# Patient Record
Sex: MALE | Race: WHITE | NOT HISPANIC OR LATINO | Employment: UNEMPLOYED | URBAN - NONMETROPOLITAN AREA
[De-identification: names, ages, dates, MRNs, and addresses within clinical notes are randomized per-mention and may not be internally consistent; named-entity substitution may affect disease eponyms.]

---
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
## 2021-02-09 ENCOUNTER — OFFICE VISIT (OUTPATIENT)
Dept: FAMILY MEDICINE CLINIC | Facility: CLINIC | Age: 21
End: 2021-02-09

## 2021-02-09 VITALS
HEART RATE: 103 BPM | OXYGEN SATURATION: 97 % | WEIGHT: 233.8 LBS | TEMPERATURE: 97.1 F | HEIGHT: 73 IN | BODY MASS INDEX: 30.99 KG/M2

## 2021-02-09 DIAGNOSIS — K08.89 TOOTH PAIN: Primary | ICD-10-CM

## 2021-02-09 PROCEDURE — 99212 OFFICE O/P EST SF 10 MIN: CPT | Performed by: FAMILY MEDICINE

## 2021-02-09 RX ORDER — AMOXICILLIN 400 MG/5ML
800 POWDER, FOR SUSPENSION ORAL 2 TIMES DAILY
Qty: 200 ML | Refills: 0 | Status: SHIPPED | OUTPATIENT
Start: 2021-02-09 | End: 2022-04-06 | Stop reason: SDUPTHER

## 2021-02-09 NOTE — PROGRESS NOTES
" Subjective   Silas Brady is a 20 y.o. male.     Chief Complaint   Patient presents with   • Dental Pain             History of Present Illness     Autistic  Tooth pain starting 3 days ago.  Suffering.  Difficult to give medicines.  vomited 2 ibuprofen.   Dentist wont see him until dad can get power of  over him.       Review of Systems   Unable to perform ROS: Psychiatric disorder           Pulse 103   Temp 97.1 °F (36.2 °C) (Infrared)   Ht 185.9 cm (73.19\")   Wt 106 kg (233 lb 12.8 oz)   SpO2 97%   BMI 30.69 kg/m²       Objective     Physical Exam  Vitals signs and nursing note reviewed.   Constitutional:       Appearance: Normal appearance. He is well-developed.   HENT:      Head: Normocephalic and atraumatic.      Right Ear: External ear normal.      Left Ear: External ear normal.      Nose: Nose normal.      Mouth/Throat:      Mouth: Mucous membranes are moist.      Pharynx: Oropharynx is clear.      Comments: Gums not red.  Looks like right inferior wisdom tooth coming in.   Eyes:      Extraocular Movements: Extraocular movements intact.      Conjunctiva/sclera: Conjunctivae normal.      Pupils: Pupils are equal, round, and reactive to light.   Neck:      Musculoskeletal: Normal range of motion.   Pulmonary:      Effort: Pulmonary effort is normal.   Musculoskeletal: Normal range of motion.   Neurological:      General: No focal deficit present.      Mental Status: He is alert and oriented to person, place, and time.   Psychiatric:         Behavior: Behavior normal.         Thought Content: Thought content normal.         Judgment: Judgment normal.             PAST MEDICAL HISTORY     Past Medical History:   Diagnosis Date   • Acute serous otitis media    • Allergic rhinitis due to pollen    • Autistic disorder     With behevariol issues    • Common cold    • Cough    • Drug therapy     Long-term drug therapy      • General medical examination     General examination of patient    • " Health maintenance examination     Individual health examination     • Nausea and vomiting       PAST SURGICAL HISTORY   No past surgical history on file.   SOCIAL HISTORY     Social History     Socioeconomic History   • Marital status: Single     Spouse name: Not on file   • Number of children: Not on file   • Years of education: Not on file   • Highest education level: Not on file   Tobacco Use   • Smoking status: Never Smoker   • Smokeless tobacco: Never Used   Substance and Sexual Activity   • Alcohol use: No   • Drug use: No   • Sexual activity: Defer      ALLERGIES   Patient has no known allergies.   MEDICATIONS     Current Outpatient Medications   Medication Sig Dispense Refill   • risperiDONE (risperDAL M-TABS) 2 MG disintegrating tablet Take 2 mg by mouth 2 (Two) Times a Day.     • amoxicillin (AMOXIL) 400 MG/5ML suspension Take 10 mL by mouth 2 (Two) Times a Day. 200 mL 0   • ibuprofen (Childrens Motrin) 100 MG/5ML suspension Take 40 mL by mouth Every 8 (Eight) Hours As Needed for Mild Pain . 480 mL 1   • mometasone (ELOCON) 0.1 % ointment Apply  topically to the appropriate area as directed Daily. 15 g 1     No current facility-administered medications for this visit.         The following portions of the patient's history were reviewed and updated as appropriate: allergies, current medications, past family history, past medical history, past social history, past surgical history and problem list.        Assessment/Plan   Diagnoses and all orders for this visit:    1. Tooth pain (Primary)    Other orders  -     amoxicillin (AMOXIL) 400 MG/5ML suspension; Take 10 mL by mouth 2 (Two) Times a Day.  Dispense: 200 mL; Refill: 0  -     ibuprofen (Childrens Motrin) 100 MG/5ML suspension; Take 40 mL by mouth Every 8 (Eight) Hours As Needed for Mild Pain .  Dispense: 480 mL; Refill: 1        Call if this doesn't help.               No follow-ups on file.                  This document has been electronically  signed by Roni Marroquin MD on February 9, 2021 11:44 CST

## 2022-04-06 ENCOUNTER — OFFICE VISIT (OUTPATIENT)
Dept: FAMILY MEDICINE CLINIC | Facility: CLINIC | Age: 22
End: 2022-04-06

## 2022-04-06 VITALS
TEMPERATURE: 97.7 F | WEIGHT: 238.5 LBS | HEIGHT: 73 IN | OXYGEN SATURATION: 98 % | HEART RATE: 97 BPM | BODY MASS INDEX: 31.61 KG/M2

## 2022-04-06 DIAGNOSIS — H61.22 IMPACTED CERUMEN OF LEFT EAR: Primary | ICD-10-CM

## 2022-04-06 PROCEDURE — 99213 OFFICE O/P EST LOW 20 MIN: CPT | Performed by: FAMILY MEDICINE

## 2022-04-06 RX ORDER — LORATADINE 10 MG/1
10 TABLET ORAL DAILY
Qty: 30 TABLET | Refills: 11 | Status: SHIPPED | OUTPATIENT
Start: 2022-04-06

## 2022-04-06 RX ORDER — AMOXICILLIN 400 MG/5ML
800 POWDER, FOR SUSPENSION ORAL 2 TIMES DAILY
Qty: 200 ML | Refills: 0 | Status: SHIPPED | OUTPATIENT
Start: 2022-04-06

## 2022-04-06 NOTE — PROGRESS NOTES
" Subjective   Silas Brady is a 21 y.o. male.     Chief Complaint   Patient presents with   • Earache             History of Present Illness     Dad says maybe? His left ear is hurting.  Silas has autism.        Review of Systems   Constitutional: Negative for chills, fatigue and fever.   HENT: Positive for ear pain. Negative for congestion, ear discharge, facial swelling, hearing loss, postnasal drip, rhinorrhea, sinus pressure, sore throat, trouble swallowing and voice change.    Eyes: Negative for discharge, redness and visual disturbance.   Respiratory: Negative for cough, chest tightness, shortness of breath and wheezing.    Cardiovascular: Negative for chest pain and palpitations.   Gastrointestinal: Negative for abdominal pain, blood in stool, constipation, diarrhea, nausea and vomiting.   Endocrine: Negative for polydipsia and polyuria.   Genitourinary: Negative for dysuria, flank pain, hematuria and urgency.   Musculoskeletal: Negative for arthralgias, back pain, joint swelling and myalgias.   Skin: Negative for rash.   Neurological: Negative for dizziness, weakness, numbness and headaches.   Hematological: Negative for adenopathy.   Psychiatric/Behavioral: Negative for confusion and sleep disturbance. The patient is not nervous/anxious.            Pulse 97   Temp 97.7 °F (36.5 °C) (Infrared)   Ht 185.9 cm (73.19\")   Wt 108 kg (238 lb 8 oz)   SpO2 98%   BMI 31.30 kg/m²       Objective     Physical Exam  Vitals and nursing note reviewed.   Constitutional:       Appearance: Normal appearance. He is well-developed.   HENT:      Head: Normocephalic and atraumatic.      Right Ear: Tympanic membrane, ear canal and external ear normal.      Left Ear: External ear normal.      Ears:      Comments: Soft cerumen impaction left ear.      Nose: Nose normal.   Eyes:      Extraocular Movements: Extraocular movements intact.      Conjunctiva/sclera: Conjunctivae normal.      Pupils: Pupils are equal, " round, and reactive to light.   Pulmonary:      Effort: Pulmonary effort is normal.   Musculoskeletal:         General: Normal range of motion.      Cervical back: Normal range of motion.   Neurological:      General: No focal deficit present.      Mental Status: He is alert and oriented to person, place, and time.   Psychiatric:         Behavior: Behavior normal.         Thought Content: Thought content normal.         Judgment: Judgment normal.             PAST MEDICAL HISTORY     Past Medical History:   Diagnosis Date   • Acute serous otitis media    • Allergic rhinitis due to pollen    • Autistic disorder     With behevariol issues    • Common cold    • Cough    • Drug therapy     Long-term drug therapy      • General medical examination     General examination of patient    • Health maintenance examination     Individual health examination     • Nausea and vomiting       PAST SURGICAL HISTORY   No past surgical history on file.   SOCIAL HISTORY     Social History     Socioeconomic History   • Marital status: Single   Tobacco Use   • Smoking status: Never Smoker   • Smokeless tobacco: Never Used   Substance and Sexual Activity   • Alcohol use: No   • Drug use: No   • Sexual activity: Defer      ALLERGIES   Patient has no known allergies.   MEDICATIONS     Current Outpatient Medications   Medication Sig Dispense Refill   • amoxicillin (AMOXIL) 400 MG/5ML suspension Take 10 mL by mouth 2 (Two) Times a Day. PHARMACIST, PLEASE PUT RX ON HOLD. DO NOT  MIX. THANKS. 200 mL 0   • risperiDONE (risperDAL M-TABS) 2 MG disintegrating tablet Take 2 mg by mouth 2 (Two) Times a Day.     • ibuprofen (Childrens Motrin) 100 MG/5ML suspension Take 40 mL by mouth Every 8 (Eight) Hours As Needed for Mild Pain . 480 mL 1   • loratadine (Claritin) 10 MG tablet Take 1 tablet by mouth Daily. 30 tablet 11   • mometasone (ELOCON) 0.1 % ointment Apply  topically to the appropriate area as directed Daily. 15 g 1     No current  facility-administered medications for this visit.        The following portions of the patient's history were reviewed and updated as appropriate: allergies, current medications, past family history, past medical history, past social history, past surgical history and problem list.        Assessment/Plan   Diagnoses and all orders for this visit:    1. Impacted cerumen of left ear (Primary)    Other orders  -     loratadine (Claritin) 10 MG tablet; Take 1 tablet by mouth Daily.  Dispense: 30 tablet; Refill: 11  -     amoxicillin (AMOXIL) 400 MG/5ML suspension; Take 10 mL by mouth 2 (Two) Times a Day. PHARMACIST, PLEASE PUT RX ON HOLD. DO NOT  MIX. THANKS.  Dispense: 200 mL; Refill: 0      We attempted to try to irrigate ear, he would not allow    If he begins to complain of lots of pain, give the amoxicillin.                  No follow-ups on file.                  This document has been electronically signed by Roni Marroquin MD on April 6, 2022 11:25 CDT

## 2022-08-26 ENCOUNTER — OFFICE VISIT (OUTPATIENT)
Dept: FAMILY MEDICINE CLINIC | Facility: CLINIC | Age: 22
End: 2022-08-26

## 2022-08-26 VITALS — HEART RATE: 114 BPM | OXYGEN SATURATION: 98 % | TEMPERATURE: 98.7 F | HEIGHT: 73 IN | BODY MASS INDEX: 31.3 KG/M2

## 2022-08-26 DIAGNOSIS — J06.9 VIRAL UPPER RESPIRATORY TRACT INFECTION: Primary | ICD-10-CM

## 2022-08-26 DIAGNOSIS — R05.9 COUGH: ICD-10-CM

## 2022-08-26 PROCEDURE — 99213 OFFICE O/P EST LOW 20 MIN: CPT | Performed by: NURSE PRACTITIONER

## 2022-08-26 RX ORDER — GUAIFENESIN/DEXTROMETHORPHAN 100-10MG/5
10 SYRUP ORAL 3 TIMES DAILY PRN
Qty: 240 ML | Refills: 1 | Status: SHIPPED | OUTPATIENT
Start: 2022-08-26

## 2022-08-26 NOTE — PROGRESS NOTES
Subjective   Silas Brady is a 22 y.o. male. Cough    History of Present Illness   Patient presents today for cough. He is accompanied by his father. He is autistic. Dad provided all information. He started feeling bad 3 days ago. Symptoms include cough, chills. Cough is productive. Experienced one episode of vomiting. No recent sick contacts. He will take liquid medications sometimes but does not take pills.    The following portions of the patient's history were reviewed and updated as appropriate: allergies, current medications, past family history, past medical history, past social history, past surgical history, and problem list.    Review of Systems   Constitutional: Positive for chills. Negative for fatigue and fever.   HENT: Negative for congestion, ear pain, rhinorrhea and sore throat.    Eyes: Negative for blurred vision, double vision and visual disturbance.   Respiratory: Positive for cough. Negative for chest tightness, shortness of breath and wheezing.    Cardiovascular: Negative for chest pain, palpitations and leg swelling.   Gastrointestinal: Positive for vomiting. Negative for abdominal pain, diarrhea and nausea.   Endocrine: Negative for cold intolerance and heat intolerance.   Genitourinary: Negative for difficulty urinating, dysuria, frequency and hematuria.   Musculoskeletal: Negative for arthralgias, back pain, neck pain and neck stiffness.   Skin: Negative for dry skin, pallor, rash, skin lesions and wound.   Allergic/Immunologic: Negative for environmental allergies, food allergies and immunocompromised state.   Neurological: Negative for dizziness, syncope, weakness, light-headedness, headache and confusion.   Hematological: Negative for adenopathy. Does not bruise/bleed easily.   Psychiatric/Behavioral: Negative for self-injury, sleep disturbance, suicidal ideas, depressed mood and stress. The patient is not nervous/anxious.        Vitals:    08/26/22 1333   Pulse: 114   Temp:  98.7 °F (37.1 °C)   SpO2: 98%     Body mass index is 31.3 kg/m².    Objective   Physical Exam  Vitals and nursing note reviewed.   Constitutional:       General: He is not in acute distress.     Appearance: He is well-developed. He is not ill-appearing.   HENT:      Head: Normocephalic.      Right Ear: Hearing, ear canal and external ear normal. Tympanic membrane is erythematous.      Left Ear: Hearing, tympanic membrane, ear canal and external ear normal.      Nose: Mucosal edema present.      Mouth/Throat:      Lips: Pink.      Mouth: Mucous membranes are moist.      Pharynx: Oropharynx is clear. Uvula midline. No oropharyngeal exudate.   Eyes:      General: Lids are normal. Gaze aligned appropriately.      Conjunctiva/sclera: Conjunctivae normal.      Pupils: Pupils are equal, round, and reactive to light.   Neck:      Thyroid: No thyroid mass, thyromegaly or thyroid tenderness.   Cardiovascular:      Rate and Rhythm: Normal rate and regular rhythm.      Heart sounds: Normal heart sounds, S1 normal and S2 normal. No murmur heard.  Pulmonary:      Effort: Pulmonary effort is normal.      Breath sounds: Normal breath sounds and air entry. No decreased breath sounds, wheezing, rhonchi or rales.   Abdominal:      General: Abdomen is flat. Bowel sounds are normal.      Palpations: Abdomen is soft.      Tenderness: There is no abdominal tenderness.   Musculoskeletal:         General: Normal range of motion.      Cervical back: Full passive range of motion without pain, normal range of motion and neck supple.   Lymphadenopathy:      Cervical: No cervical adenopathy.   Skin:     General: Skin is warm and dry.   Neurological:      General: No focal deficit present.      Mental Status: He is alert and oriented to person, place, and time.      Coordination: Coordination is intact.      Gait: Gait is intact.   Psychiatric:         Attention and Perception: Attention normal.         Mood and Affect: Mood normal.          "Speech: Speech normal.         Behavior: Behavior normal. Behavior is cooperative.         Thought Content: Thought content normal.         Cognition and Memory: Cognition normal.         Judgment: Judgment normal.           Assessment & Plan   Diagnoses and all orders for this visit:    1. Viral upper respiratory tract infection (Primary)    2. Cough  -     Cancel: POCT SARS-CoV-2 Antigen TYRONE + Flu  -     guaiFENesin-dextromethorphan (ROBITUSSIN DM) 100-10 MG/5ML syrup; Take 10 mL by mouth 3 (Three) Times a Day As Needed for Cough or Congestion.  Dispense: 240 mL; Refill: 1    Exam unremarkable.  Symptoms consistent with URI.  Dad declined COVID 19 test.  URI also known as the \"common cold\" is a viral infection, no antibiotics are necessary. We will treat symptoms and encourage plenty of rest and hydration. Duration of symptoms is 1-1.5 weeks. It's important to practice good hand hygiene. Cough in bend of arm, not your hand.  Take robitussin DM 10 ml TID PRN for cough/congestion.  If symptoms do not improve or worsen, patient was instructed to return to clinic for further evaluation.         This document has been electronically signed by JAYLENE Maciel on  August 26, 2022 13:54 CDT                "